# Patient Record
Sex: MALE | Race: WHITE | NOT HISPANIC OR LATINO | URBAN - NONMETROPOLITAN AREA
[De-identification: names, ages, dates, MRNs, and addresses within clinical notes are randomized per-mention and may not be internally consistent; named-entity substitution may affect disease eponyms.]

---

## 2017-08-21 ENCOUNTER — OFFICE VISIT (OUTPATIENT)
Dept: RETAIL CLINIC | Facility: CLINIC | Age: 22
End: 2017-08-21

## 2017-08-21 DIAGNOSIS — Z02.83 ENCOUNTER FOR DRUG SCREENING: Primary | ICD-10-CM

## 2017-08-21 NOTE — PROGRESS NOTES
Lance Aparicio is a 20 y.o. male who presents to the clinic today for e-screen urine drug screen. See scanned [facility].

## 2021-11-26 ENCOUNTER — APPOINTMENT (OUTPATIENT)
Dept: GENERAL RADIOLOGY | Facility: HOSPITAL | Age: 26
End: 2021-11-26

## 2021-11-26 ENCOUNTER — HOSPITAL ENCOUNTER (EMERGENCY)
Facility: HOSPITAL | Age: 26
Discharge: HOME | End: 2021-11-26
Admitting: EMERGENCY MEDICINE

## 2021-11-26 VITALS
OXYGEN SATURATION: 98 % | SYSTOLIC BLOOD PRESSURE: 114 MMHG | HEIGHT: 74 IN | WEIGHT: 220 LBS | TEMPERATURE: 97.9 F | HEART RATE: 73 BPM | BODY MASS INDEX: 28.23 KG/M2 | RESPIRATION RATE: 18 BRPM | DIASTOLIC BLOOD PRESSURE: 61 MMHG

## 2021-11-26 DIAGNOSIS — S63.502A SPRAIN OF LEFT WRIST, INITIAL ENCOUNTER: Primary | ICD-10-CM

## 2021-11-26 PROCEDURE — 73110 X-RAY EXAM OF WRIST: CPT

## 2021-11-26 PROCEDURE — 99283 EMERGENCY DEPT VISIT LOW MDM: CPT

## 2021-11-26 PROCEDURE — 73130 X-RAY EXAM OF HAND: CPT

## 2021-11-26 RX ORDER — KETOROLAC TROMETHAMINE 10 MG/1
10 TABLET, FILM COATED ORAL EVERY 6 HOURS PRN
Qty: 15 TABLET | Refills: 0 | Status: SHIPPED | OUTPATIENT
Start: 2021-11-26

## 2022-10-27 ENCOUNTER — HOSPITAL ENCOUNTER (OUTPATIENT)
Dept: GENERAL RADIOLOGY | Age: 27
Discharge: HOME | End: 2022-10-27
Payer: COMMERCIAL

## 2022-10-27 DIAGNOSIS — M25.522 LEFT ELBOW PAIN: ICD-10-CM

## 2022-10-27 PROCEDURE — 73080 X-RAY EXAM OF ELBOW: CPT | Performed by: RADIOLOGY

## 2022-10-27 PROCEDURE — 73080 X-RAY EXAM OF ELBOW: CPT

## 2024-07-10 ENCOUNTER — HOSPITAL ENCOUNTER (EMERGENCY)
Age: 29
Discharge: HOME | End: 2024-07-10
Attending: EMERGENCY MEDICINE
Payer: COMMERCIAL

## 2024-07-10 ENCOUNTER — APPOINTMENT (OUTPATIENT)
Dept: GENERAL RADIOLOGY | Age: 29
End: 2024-07-10
Payer: COMMERCIAL

## 2024-07-10 VITALS
WEIGHT: 221.78 LBS | DIASTOLIC BLOOD PRESSURE: 78 MMHG | RESPIRATION RATE: 18 BRPM | HEART RATE: 87 BPM | SYSTOLIC BLOOD PRESSURE: 128 MMHG | TEMPERATURE: 98.6 F | HEIGHT: 74 IN | BODY MASS INDEX: 28.46 KG/M2 | OXYGEN SATURATION: 97 %

## 2024-07-10 DIAGNOSIS — S93.401A MODERATE RIGHT ANKLE SPRAIN, INITIAL ENCOUNTER: Primary | ICD-10-CM

## 2024-07-10 PROCEDURE — 99283 EMERGENCY DEPT VISIT LOW MDM: CPT

## 2024-07-10 PROCEDURE — 73590 X-RAY EXAM OF LOWER LEG: CPT

## 2024-07-10 PROCEDURE — 73630 X-RAY EXAM OF FOOT: CPT

## 2024-07-10 PROCEDURE — 6370000000 HC RX 637 (ALT 250 FOR IP): Performed by: EMERGENCY MEDICINE

## 2024-07-10 RX ORDER — IBUPROFEN 800 MG/1
800 TABLET ORAL EVERY 8 HOURS PRN
Qty: 30 TABLET | Refills: 0 | Status: SHIPPED | OUTPATIENT
Start: 2024-07-10

## 2024-07-10 RX ORDER — IBUPROFEN 800 MG/1
800 TABLET ORAL ONCE
Status: COMPLETED | OUTPATIENT
Start: 2024-07-10 | End: 2024-07-10

## 2024-07-10 RX ADMIN — IBUPROFEN 800 MG: 800 TABLET, FILM COATED ORAL at 21:12

## 2024-07-10 ASSESSMENT — PAIN DESCRIPTION - ORIENTATION
ORIENTATION: RIGHT

## 2024-07-10 ASSESSMENT — PAIN DESCRIPTION - LOCATION
LOCATION: ANKLE

## 2024-07-10 ASSESSMENT — LIFESTYLE VARIABLES
HOW MANY STANDARD DRINKS CONTAINING ALCOHOL DO YOU HAVE ON A TYPICAL DAY: PATIENT DOES NOT DRINK
HOW OFTEN DO YOU HAVE A DRINK CONTAINING ALCOHOL: NEVER

## 2024-07-10 ASSESSMENT — PAIN DESCRIPTION - DESCRIPTORS: DESCRIPTORS: STABBING

## 2024-07-10 ASSESSMENT — PAIN - FUNCTIONAL ASSESSMENT
PAIN_FUNCTIONAL_ASSESSMENT: 0-10
PAIN_FUNCTIONAL_ASSESSMENT: 0-10

## 2024-07-10 ASSESSMENT — PAIN SCALES - GENERAL
PAINLEVEL_OUTOF10: 8
PAINLEVEL_OUTOF10: 5
PAINLEVEL_OUTOF10: 10

## 2024-07-10 ASSESSMENT — PAIN DESCRIPTION - PAIN TYPE
TYPE: ACUTE PAIN
TYPE: ACUTE PAIN

## 2024-07-10 NOTE — ED TRIAGE NOTES
Pt fell on ladder while working on a boat, and felt right ankle pop and is now unable to bear weight on it and has pain shooting up his leg. Pt states it hurts to hold foot straight, like there is a pressure keeping it bent inwards. Pt able to move all toes on that foot and has good pulses above and below ankle. Pt Aox4 but is not ambulatory without assistance due to pain

## 2024-07-11 NOTE — ED PROVIDER NOTES
the midfoot.    DIAGNOSTIC RESULTS     RADIOLOGY:      Interpretation per the Radiologist below, if available at the time of this note:    XR TIBIA FIBULA RIGHT (2 VIEWS)   Final Result   Right lower leg and right foot: No acute abnormality.         XR FOOT RIGHT (MIN 3 VIEWS)   Final Result   Right lower leg and right foot: No acute abnormality.                    EMERGENCY DEPARTMENT COURSE and DIFFERENTIAL DIAGNOSIS/MDM:          Vitals:    Vitals:    07/10/24 1945 07/10/24 2120   BP: 120/76 128/78   Pulse: 90 87   Resp: 18 18   Temp: 98.6 °F (37 °C)    TempSrc: Oral    SpO2: 96% 97%   Weight: 100.6 kg (221 lb 12.5 oz)    Height: 1.88 m (6' 2\")         Patient was given the following medications:   Medications   ibuprofen (ADVIL;MOTRIN) tablet 800 mg (800 mg Oral Given 7/10/24 2112)        CC/HPI Summary, DDx, ED Course, and Reassessment: I advised patient that no fracture was found.  We placed him in a air splint with an Ace wrap and advised him to return if he still has pain in a week or to have repeat x-rays done as an outpatient.  Also advised return for new or worsening symptoms.      I am the primary physician of Record.     FINAL IMPRESSION    1. Moderate right ankle sprain, initial encounter         DISPOSITION/PLAN   DISPOSITION Decision To Discharge 07/10/2024 08:46:44 PM       PATIENT REFERRED TO:   [facility]  [phone]  Schedule an appointment as soon as possible for a visit       [place] [id number]  [phone]  Go to   immediately if symptoms worsen     DISCHARGE MEDICATIONS:   Discharge Medication List as of 7/10/2024  9:09 PM        START taking these medications    Details   ibuprofen (ADVIL;MOTRIN) 800 MG tablet Take 1 tablet by mouth every 8 hours as needed for Pain, Disp-30 tablet, R-0Normal            DISCONTINUED MEDICATIONS:   Discharge Medication List as of 7/10/2024  9:09 PM               (Please note

## 2024-07-11 NOTE — ED NOTES
Discharge and education instructions reviewed. Patient verbalized understanding, teach-back successful. Patient denied questions at this time. No acute distress noted. Patient instructed to follow-up as noted - return to emergency department if symptoms worsen. Patient verbalized understanding. Discharged per EDMD with discharge instructions.  Pt demonstrated understanding of crutches ambulation education and brace use. Pt Aox4 and ambulatory with crutches.